# Patient Record
(demographics unavailable — no encounter records)

---

## 2024-11-04 NOTE — DISCUSSION/SUMMARY

## 2024-11-04 NOTE — HISTORY OF PRESENT ILLNESS
[de-identified] : JOHANNE FAY is a 24-year-old female who presents for a follow-up for postural imbalance. Last visit was on 08/08/2024. She is attending PT 1x a week at [facility]. She reports that the hip pain has resolved, but there is persistent tightness in the back, especially when not consistent with exercises As per left quad pain is still there, she experiences it after standing for longer periods of time occasionally. The tightness is more noticeable when sitting for prolonged periods or when standing for long durations, particularly when tired. She feels heaviness on the left side. The patient notes that the foot sometimes turns outwards, and there is a forceful tension in the back. The patient does not take any medication for pain. The patient experiences automatic pressure on the bottom of the foot and left buttock when standing or walking, but this is not constant and has improved compared to before. Denies any numbness or tingling down the leg, and bladder or bowel irregularities.

## 2024-11-04 NOTE — PHYSICAL EXAM
[de-identified] : General: Well-nourished, well-developed, alert, and in no acute distress.  Head: Normocephalic.  Eyes: Pupils equal, extraocular muscles intact, normal sclera.  Nose: No nasal discharge.  Cardiovascular: Extremities are warm and well-perfused. Distal pulses are symmetric bilaterally.  Respiratory: No labored breathing.  Extremities: Sensation is intact distally bilaterally. Distal pulses are symmetric bilaterally  Lymphatic: No regional lymphadenopathy, no lymphedema  Neurologic: No focal deficits  Skin: Normal skin color, texture, and turgor  Psychiatric: Normal affect  MSK: Examination of the Lumbar Spine:  AROM: forward flexion, extension full and pain-free  Nontender to palpation: midline, paraspinals, SI jt, GTB, piriformis, gluteals   Lumbar Facet Loading [negative]     Right hip Range of Motion: Internal rotation: [25] degrees, External rotation: [80] degrees, Flexion [120] degrees     Log roll negative HARLAN negative FADIR negative Mitchell negative   SLR negative. Tight Hamstrings Piriformis compression negative ASIS distraction negative Iliac compression negative   Left hip:   Range of Motion: Internal rotation: [25] degrees, External rotation: [80] degrees, Flexion [120] degrees   Special tests: HARLAN negative FADIR negative Mitchell negative   SLR negative. Tight Hamstrings Piriformis compression negative ASIS distraction negative Iliac compression negative     Sensation is intact to light touch over the superficial and deep peroneal nerve distributions and the posterior tibial nerve distribution. Capillary refill is less than two seconds. Posterior tibial and dorsalis pedis pulses 2+ equal bilaterally. No calf swelling or tenderness bilaterally. Strength testing shows Hip flexion 5/5, Hip adduction 5/5, Hip abduction 5/5, Knee Extension 5/5, Knee Flexion 5/5, dorsiflexion 5/5, plantar flexion 5/5, EHL 5/5 Reflexes: Patellar 2+, Achilles 2+.

## 2024-11-04 NOTE — DISCUSSION/SUMMARY

## 2024-11-04 NOTE — END OF VISIT
[Time Spent: ___ minutes] : I have spent [unfilled] minutes of time on the encounter which excludes teaching and separately reported services. [FreeTextEntry3] : I, Gisella Pacheco ([facility]) assisted in filling out this chart under the dictation of Citlaly De Jesus on 11/01/2024 .

## 2024-11-04 NOTE — DISCUSSION/SUMMARY

## 2024-11-04 NOTE — HISTORY OF PRESENT ILLNESS
[de-identified] : JOHANNE FAY is a 24-year-old female who presents for a follow-up for postural imbalance. Last visit was on 08/08/2024. She is attending PT 1x a week at [facility]. She reports that the hip pain has resolved, but there is persistent tightness in the back, especially when not consistent with exercises As per left quad pain is still there, she experiences it after standing for longer periods of time occasionally. The tightness is more noticeable when sitting for prolonged periods or when standing for long durations, particularly when tired. She feels heaviness on the left side. The patient notes that the foot sometimes turns outwards, and there is a forceful tension in the back. The patient does not take any medication for pain. The patient experiences automatic pressure on the bottom of the foot and left buttock when standing or walking, but this is not constant and has improved compared to before. Denies any numbness or tingling down the leg, and bladder or bowel irregularities.

## 2024-11-04 NOTE — ADDENDUM
[FreeTextEntry1] : Documented by Gisella Pacheco acting as a scribe for Citlaly De Jesus on 11/01/2024   All medical record entries made by the Scribe were at my, Dr. Citlaly De Jesus direction and personally dictated by me on 11/01/2024 . I have reviewed the chart and agree that the record accurately reflects my personal performance of the history, physical exam, assessment and plan. I have also personally directed, reviewed, and agreed with the chart.

## 2024-11-04 NOTE — HISTORY OF PRESENT ILLNESS
[de-identified] : JOHANNE FAY is a 24-year-old female who presents for a follow-up for postural imbalance. Last visit was on 08/08/2024. She is attending PT 1x a week at [facility]. She reports that the hip pain has resolved, but there is persistent tightness in the back, especially when not consistent with exercises As per left quad pain is still there, she experiences it after standing for longer periods of time occasionally. The tightness is more noticeable when sitting for prolonged periods or when standing for long durations, particularly when tired. She feels heaviness on the left side. The patient notes that the foot sometimes turns outwards, and there is a forceful tension in the back. The patient does not take any medication for pain. The patient experiences automatic pressure on the bottom of the foot and left buttock when standing or walking, but this is not constant and has improved compared to before. Denies any numbness or tingling down the leg, and bladder or bowel irregularities.

## 2024-11-04 NOTE — PHYSICAL EXAM
[de-identified] : General: Well-nourished, well-developed, alert, and in no acute distress.  Head: Normocephalic.  Eyes: Pupils equal, extraocular muscles intact, normal sclera.  Nose: No nasal discharge.  Cardiovascular: Extremities are warm and well-perfused. Distal pulses are symmetric bilaterally.  Respiratory: No labored breathing.  Extremities: Sensation is intact distally bilaterally. Distal pulses are symmetric bilaterally  Lymphatic: No regional lymphadenopathy, no lymphedema  Neurologic: No focal deficits  Skin: Normal skin color, texture, and turgor  Psychiatric: Normal affect  MSK: Examination of the Lumbar Spine:  AROM: forward flexion, extension full and pain-free  Nontender to palpation: midline, paraspinals, SI jt, GTB, piriformis, gluteals   Lumbar Facet Loading [negative]     Right hip Range of Motion: Internal rotation: [25] degrees, External rotation: [80] degrees, Flexion [120] degrees     Log roll negative HARLAN negative FADIR negative Mitchell negative   SLR negative. Tight Hamstrings Piriformis compression negative ASIS distraction negative Iliac compression negative   Left hip:   Range of Motion: Internal rotation: [25] degrees, External rotation: [80] degrees, Flexion [120] degrees   Special tests: HARLAN negative FADIR negative Mitchell negative   SLR negative. Tight Hamstrings Piriformis compression negative ASIS distraction negative Iliac compression negative     Sensation is intact to light touch over the superficial and deep peroneal nerve distributions and the posterior tibial nerve distribution. Capillary refill is less than two seconds. Posterior tibial and dorsalis pedis pulses 2+ equal bilaterally. No calf swelling or tenderness bilaterally. Strength testing shows Hip flexion 5/5, Hip adduction 5/5, Hip abduction 5/5, Knee Extension 5/5, Knee Flexion 5/5, dorsiflexion 5/5, plantar flexion 5/5, EHL 5/5 Reflexes: Patellar 2+, Achilles 2+.

## 2024-11-04 NOTE — END OF VISIT
[Time Spent: ___ minutes] : I have spent [unfilled] minutes of time on the encounter which excludes teaching and separately reported services. [FreeTextEntry3] : I, Gisella Pacheco ([facility]) assisted in filling out this chart under the dictation of Citlaly De Jesus on 11/01/2024 .

## 2024-11-04 NOTE — END OF VISIT
[FreeTextEntry3] : I, Gisella Pacheco ([facility]) assisted in filling out this chart under the dictation of Citlaly De Jesus on 11/01/2024 . [Time Spent: ___ minutes] : I have spent [unfilled] minutes of time on the encounter which excludes teaching and separately reported services.

## 2024-11-04 NOTE — PHYSICAL EXAM
[de-identified] : General: Well-nourished, well-developed, alert, and in no acute distress.  Head: Normocephalic.  Eyes: Pupils equal, extraocular muscles intact, normal sclera.  Nose: No nasal discharge.  Cardiovascular: Extremities are warm and well-perfused. Distal pulses are symmetric bilaterally.  Respiratory: No labored breathing.  Extremities: Sensation is intact distally bilaterally. Distal pulses are symmetric bilaterally  Lymphatic: No regional lymphadenopathy, no lymphedema  Neurologic: No focal deficits  Skin: Normal skin color, texture, and turgor  Psychiatric: Normal affect  MSK: Examination of the Lumbar Spine:  AROM: forward flexion, extension full and pain-free  Nontender to palpation: midline, paraspinals, SI jt, GTB, piriformis, gluteals   Lumbar Facet Loading [negative]     Right hip Range of Motion: Internal rotation: [25] degrees, External rotation: [80] degrees, Flexion [120] degrees     Log roll negative HARLAN negative FADIR negative Mitchell negative   SLR negative. Tight Hamstrings Piriformis compression negative ASIS distraction negative Iliac compression negative   Left hip:   Range of Motion: Internal rotation: [25] degrees, External rotation: [80] degrees, Flexion [120] degrees   Special tests: HARLAN negative FADIR negative Mitchell negative   SLR negative. Tight Hamstrings Piriformis compression negative ASIS distraction negative Iliac compression negative     Sensation is intact to light touch over the superficial and deep peroneal nerve distributions and the posterior tibial nerve distribution. Capillary refill is less than two seconds. Posterior tibial and dorsalis pedis pulses 2+ equal bilaterally. No calf swelling or tenderness bilaterally. Strength testing shows Hip flexion 5/5, Hip adduction 5/5, Hip abduction 5/5, Knee Extension 5/5, Knee Flexion 5/5, dorsiflexion 5/5, plantar flexion 5/5, EHL 5/5 Reflexes: Patellar 2+, Achilles 2+.

## 2024-11-04 NOTE — ASSESSMENT
[FreeTextEntry1] : JOHANNE FAY is a 24-year-old female with low back discomfort. I discussed with the patient that their symptoms, signs, and imaging are most consistent with myofascial pain. We reviewed the natural history of this condition and treatment options. We agreed on the following plan:   Encouraged to continue home exercises per handout. Continue physical therapy. New referral provided. Recommend 150 min per week of moderate-intensity aerobic activity  Imaging: Consider X-ray or MRI, if symptoms worsen. Follow up in 3 months